# Patient Record
Sex: MALE | Race: WHITE
[De-identification: names, ages, dates, MRNs, and addresses within clinical notes are randomized per-mention and may not be internally consistent; named-entity substitution may affect disease eponyms.]

---

## 2020-01-04 ENCOUNTER — HOSPITAL ENCOUNTER (EMERGENCY)
Dept: HOSPITAL 41 - JD.ED | Age: 40
Discharge: HOME | End: 2020-01-04
Payer: SELF-PAY

## 2020-01-04 VITALS — HEART RATE: 88 BPM | DIASTOLIC BLOOD PRESSURE: 109 MMHG | SYSTOLIC BLOOD PRESSURE: 150 MMHG

## 2020-01-04 DIAGNOSIS — F17.210: ICD-10-CM

## 2020-01-04 DIAGNOSIS — K52.9: Primary | ICD-10-CM

## 2020-01-04 DIAGNOSIS — Z98.890: ICD-10-CM

## 2020-01-04 NOTE — EDM.PDOC
ED HPI GENERAL MEDICAL PROBLEM





- General


Chief Complaint: Gastrointestinal Problem


Stated Complaint: DIARRHEA


Time Seen by Provider: 01/04/20 16:59


Source of Information: Reports: Patient


History Limitations: Reports: No Limitations





- History of Present Illness


INITIAL COMMENTS - FREE TEXT/NARRATIVE: 





Chacorta is a 39 year old male presents today for evaluation and treatment of 

diarrhea. Reports yesterday he ate a raw oyster. Later that night he developed 

vomiting and diarrhea. He reports today the vomiting has resolved. Nausea is 

improved. He has had 4 episodes of diarrhea over the last 2 hours which is 

improved from yesterday. He has not had any blood in his stool. No blood in his 

vomit. He reports some abdominal cramps and states that these have also 

improved.





Denies any recent travel. Denies any antibiotics recently.





- Related Data


 Allergies











Allergy/AdvReac Type Severity Reaction Status Date / Time


 


No Known Allergies Allergy   Verified 01/04/20 15:28











Home Meds: 


 Home Meds





. [No Known Home Meds]  01/04/20 [History]











Past Medical History





- Past Health History


Medical/Surgical History: Denies Medical/Surgical History





- Past Surgical History


HEENT Surgical History: Reports: Oral Surgery





Social & Family History





- Family History


Family Medical History: Noncontributory





- Tobacco Use


Smoking Status *Q: Current Every Day Smoker


Years of Tobacco use: 18


Packs/Tins Daily: 0.5





- Caffeine Use


Caffeine Use: Reports: Coffee





- Recreational Drug Use


Recreational Drug Use: No





ED ROS GENERAL





- Review of Systems


Review Of Systems: See Below


Constitutional: Denies: Fever, Chills


GI/Abdominal: Reports: Abdominal Pain, Diarrhea, Nausea, Vomiting.  Denies: 

Hematemesis, Hematochezia, Melena





ED EXAM, GI/ABD





- Physical Exam


Exam: See Below


Exam Limited By: No Limitations


General Appearance: Alert, WD/WN, No Apparent Distress


Respiratory/Chest: No Respiratory Distress, Lungs Clear, Normal Breath Sounds


Cardiovascular: Normal Peripheral Pulses, Regular Rate, Rhythm, No Murmur


GI/Abdominal Exam: Normal Bowel Sounds, Soft, Non-Tender


Neurological: Alert, Oriented, Normal Cognition


Psychiatric: Normal Affect, Normal Mood


Skin Exam: Warm, Dry, Normal Color





Course





- Vital Signs


Last Recorded V/S: 


 Last Vital Signs











Temp      


 


Pulse  88   01/04/20 15:30


 


Resp  16   01/04/20 15:30


 


BP  150/109 H  01/04/20 15:30


 


Pulse Ox  99   01/04/20 15:30














- Re-Assessments/Exams


Free Text/Narrative Re-Assessment/Exam: 





01/04/20 16:45


The patient's symptoms are improving. I did offer him blood work such as a CMP 

but he declined. He states he is here primarily for work note. He works in food 

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





Discharge instructions as documented.





Departure





- Departure


Time of Disposition: 16:59


Disposition: Home, Self-Care 01


Condition: Good


Clinical Impression: 


 Gastroenteritis








- Discharge Information


*PRESCRIPTION DRUG MONITORING PROGRAM REVIEWED*: No


*COPY OF PRESCRIPTION DRUG MONITORING REPORT IN PATIENT FAREED: No


Instructions:  Viral Gastroenteritis, Adult, Easy-to-Read


Referrals: 


PCP,None [Primary Care Provider] - 


Forms:  ED Department Discharge, ED Return to Work/School Form





Sepsis Event Note





- Evaluation


Sepsis Screening Result: No Definite Risk





- Focused Exam


Vital Signs: 


 Vital Signs











  Pulse Resp BP Pulse Ox


 


 01/04/20 15:30  88  16  150/109 H  99











Date Exam was Performed: 01/04/20


Time Exam was Performed: 19:39